# Patient Record
Sex: MALE | Race: WHITE
[De-identification: names, ages, dates, MRNs, and addresses within clinical notes are randomized per-mention and may not be internally consistent; named-entity substitution may affect disease eponyms.]

---

## 2019-12-09 ENCOUNTER — HOSPITAL ENCOUNTER (EMERGENCY)
Dept: HOSPITAL 59 - ER | Age: 29
Discharge: HOME | End: 2019-12-09
Payer: COMMERCIAL

## 2019-12-09 DIAGNOSIS — R11.2: ICD-10-CM
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
## 2019-12-09 DIAGNOSIS — R06.02: ICD-10-CM

## 2019-12-09 DIAGNOSIS — E86.0: ICD-10-CM

## 2019-12-09 DIAGNOSIS — F10.129: Primary | ICD-10-CM

## 2019-12-09 DIAGNOSIS — R53.1: ICD-10-CM

## 2019-12-09 LAB
ABSOLUTE NEUTROPHIL COUNT: 5.23
ALBUMIN SERPL-MCNC: 5.1 G/DL (ref 4–5)
ALP SERPL-CCNC: 89 U/L (ref 40–129)
ALT SERPL-CCNC: 45 U/L (ref ?–41)
ANION GAP SERPL CALC-SCNC: 22 MMOL/L (ref 7–16)
AST SERPL-CCNC: 36 U/L (ref 10–50)
BASOPHILS NFR BLD: 0.3 % (ref 0–6)
BILIRUB DIRECT SERPL-MCNC: < 0.2 MG/DL (ref 0–0.3)
BILIRUB SERPL-MCNC: 0.8 MG/DL (ref 0.2–1)
BUN SERPL-MCNC: 12 MG/DL (ref 6–20)
CO2 SERPL-SCNC: 26 MMOL/L (ref 22–29)
CREAT SERPL-MCNC: 0.8 MG/DL (ref 0.7–1.2)
EOSINOPHIL NFR BLD: 0.1 % (ref 0–6)
ERYTHROCYTE [DISTWIDTH] IN BLOOD BY AUTOMATED COUNT: 12.2 % (ref 11.5–14.5)
EST GLOMERULAR FILTRATION RATE: > 60 ML/MIN
ETHANOL SERPL-MCNC: 0.07 G/DL (ref 0–0.01)
GLUCOSE SERPL-MCNC: 113 MG/DL (ref 74–109)
GRANULOCYTES NFR BLD: 66.3 % (ref 47–80)
HCT VFR BLD CALC: 46.9 % (ref 42–52)
HGB BLD-MCNC: 16.1 GM/DL (ref 14–18)
LYMPHOCYTES NFR BLD AUTO: 25.7 % (ref 16–45)
MCH RBC QN AUTO: 30.6 PG (ref 27–33)
MCHC RBC AUTO-ENTMCNC: 34.3 G/DL (ref 32–36)
MCV RBC AUTO: 89.2 FL (ref 81–97)
MONOCYTES NFR BLD: 7.6 % (ref 0–9)
PLATELET # BLD: 253 K/UL (ref 130–400)
PMV BLD AUTO: 9 FL (ref 7.4–10.4)
PROT SERPL-MCNC: 8.1 G/DL (ref 6.6–8.7)
RBC # BLD AUTO: 5.26 M/UL (ref 4.4–5.7)
WBC # BLD AUTO: 7.9 K/UL (ref 4.2–12.2)

## 2019-12-09 PROCEDURE — 96361 HYDRATE IV INFUSION ADD-ON: CPT

## 2019-12-09 PROCEDURE — 80076 HEPATIC FUNCTION PANEL: CPT

## 2019-12-09 PROCEDURE — 80048 BASIC METABOLIC PNL TOTAL CA: CPT

## 2019-12-09 PROCEDURE — 96374 THER/PROPH/DIAG INJ IV PUSH: CPT

## 2019-12-09 PROCEDURE — 99284 EMERGENCY DEPT VISIT MOD MDM: CPT

## 2019-12-09 PROCEDURE — 85025 COMPLETE CBC W/AUTO DIFF WBC: CPT

## 2019-12-09 PROCEDURE — 80320 DRUG SCREEN QUANTALCOHOLS: CPT

## 2019-12-09 NOTE — EMERGENCY DEPARTMENT RECORD
History of Present Illness





- General


Chief complaint: Dehydration


Stated complaint: DEHYDRATED,ALCOHOL POISONING


Time Seen by Provider: 19 13:05


Source: Patient


Mode of Arrival: Ambulatory


Limitations: No limitations





- History of Present Illness


Initial comments: 


The patient is here due to drinking to much alcohol last evening and since has 

had persistent vomiting. He did have some SOB earlier but feels that is due to 

anxiety. He denies any AP, CP, or fevers and is more relaxed now. 





MD Complaint: Generalized weakness


Onset/Timin


-: Days(s)


Location: Generalized





- Maggie Coma Scale


Eye Response: (4) Open spontaneously


Motor Response: (6) Obeys commands


Verbal Response: (5) Oriented


Maggie Total: 15





- Related Data


                                Home Medications











 Medication  Instructions  Recorded  Confirmed  Last Taken


 


Albuterol Sulfate [Proventil Hfa] 6.7 gm IH ASDIR 19 Unknown


 


Cetirizine HCl [Zyrtec] 10 mg PO DAILY 19 Unknown








                                  Previous Rx's











 Medication  Instructions  Recorded


 


Ondansetron [Zofran Odt] 4 mg SL .Q4-6H PRN #12 tab.rapdis 19











                                    Allergies











Allergy/AdvReac Type Severity Reaction Status Date / Time


 


No Known Drug Allergies Allergy   Verified 19 13:08














Travel Screening





- Travel/Exposure Within Last 30 Days


Have you traveled within the last 30 days?: No





Review of Systems


Constitutional: Denies: Chills, Fever


Eyes: Denies: Eye discharge


ENT: Denies: Congestion


Respiratory: Denies: Cough, Dyspnea





Past Medical History





- SOCIAL HISTORY


Smoking Status: Never smoker


Alcohol Use: Heavy


Drug Use: None





- RESPIRATORY


Hx Respiratory Disorders: Yes


Hx Asthma: Yes


Comment:: seasonal allergies





- CARDIOVASCULAR


Hx Cardio Disorders: No





- NEURO


Hx Neuro Disorders: No





- GI


Hx GI Disorders: No





- 


Hx Genitourinary Disorders: No





- ENDOCRINE


Hx Endocrine Disorders: No





- MUSCULOSKELETAL


Hx Musculoskeletal Disorders: No





- PSYCH


Hx Psych Problems: No





- HEMATOLOGY/ONCOLOGY


Hx Hematology/Oncology Disorders: No





Family Medical History


Any Significant Family History?: No





Physical Exam





- General


General Appearance: Alert, Oriented x3, Cooperative, No acute distress





- Head


Head exam: Atraumatic, Normocephalic, Normal inspection





- Eye


Eye exam: Normal appearance, PERRL, EOMI





- ENT


Throat exam: Normal inspection.  negative: Tonsillar erythema, Tonsillar exudate





- Neck


Neck exam: Normal inspection, Full ROM.  negative: Tenderness





- Respiratory


Respiratory exam: Normal lung sounds bilaterally.  negative: Respiratory 

distress





- Cardiovascular


Cardiovascular Exam: Regular rate, Normal rhythm, Normal heart sounds





- GI/Abdominal


GI/Abdominal exam: Soft, Normal bowel sounds.  negative: Rebound, Rigid, 

Tenderness





- Extremities


Extremities exam: Normal inspection, Full ROM, Normal capillary refill.  

negative: Tenderness





- Back


Back exam: Denies: Vertebral tenderness





- Neurological


Neurological exam: Alert, Normal gait.  negative: Abnormal gait, Motor sensory 

deficit





- Psychiatric


Psychiatric exam: negative: Anxious





Course





                                   Vital Signs











  19





  13:04


 


Temperature 97.5 F L


 


Pulse Rate 116 H


 


Respiratory 18





Rate 


 


Blood Pressure 156/104


 


Pulse Ox 98














- Reevaluation(s)


Reevaluation #1: 


The patient is doing a LOT better at this time and denies any pain, anxiety or 

nausea. I did discuss the need to cut back on his alcohol intake and he does 

agree with that plan. 


19 14:50








Medical Decision Making





- Data Complexity


MDM Data: Labs Ordered and/or Reviewed





- Lab Data


Result diagrams: 


                                 19 13:10





                                 19 13:10





Disposition


Disposition: Discharge


Clinical Impression: 


 Alcohol abuse





Disposition: Home, Self-Care


Condition: (2) Stable


Instructions:  Abuse of Alcohol (ED)


Additional Instructions: 


Please cut back your drinking of alcohol and use the Zofran for nausea. Please 

see your family doctor for recheck later this week and also to have your blood 

pressure evaluated further. Return to the ER for any worsening symptoms.


Prescriptions: 


Ondansetron [Zofran Odt] 4 mg SL .Q4-6H PRN #12 tab.rapdis


 PRN Reason: Nausea


Forms:  Patient Portal Access


Time of Disposition: 14:52





Quality





- Quality Measures


Quality Measures: N/A





- Blood Pressure Screening


View Details: Yes


Does Patient Have Any of the Following: No


Blood Pressure Classification: Hypertensive Reading


Systolic Measurement: 156


Diastolic Measurement: 104


Screening for High Blood Pressure: < First Hypertensive BP, F/U Documented > 

[]


First Hypertensive Follow-up Interventions: Referral to alternative/primary care

 provider.